# Patient Record
Sex: MALE | Race: BLACK OR AFRICAN AMERICAN | NOT HISPANIC OR LATINO
[De-identification: names, ages, dates, MRNs, and addresses within clinical notes are randomized per-mention and may not be internally consistent; named-entity substitution may affect disease eponyms.]

---

## 2021-05-04 DIAGNOSIS — Z09 ENCOUNTER FOR FOLLOW-UP EXAMINATION AFTER COMPLETED TREATMENT FOR CONDITIONS OTHER THAN MALIGNANT NEOPLASM: ICD-10-CM

## 2021-05-04 DIAGNOSIS — Z00.00 ENCOUNTER FOR GENERAL ADULT MEDICAL EXAMINATION W/OUT ABNORMAL FINDINGS: ICD-10-CM

## 2021-05-06 ENCOUNTER — APPOINTMENT (OUTPATIENT)
Dept: SURGERY | Facility: CLINIC | Age: 61
End: 2021-05-06
Payer: COMMERCIAL

## 2021-05-06 VITALS
BODY MASS INDEX: 36.65 KG/M2 | SYSTOLIC BLOOD PRESSURE: 144 MMHG | HEART RATE: 108 BPM | DIASTOLIC BLOOD PRESSURE: 70 MMHG | TEMPERATURE: 97.9 F | OXYGEN SATURATION: 94 % | HEIGHT: 70 IN | WEIGHT: 256 LBS

## 2021-05-06 DIAGNOSIS — R10.9 UNSPECIFIED ABDOMINAL PAIN: ICD-10-CM

## 2021-05-06 PROCEDURE — 99072 ADDL SUPL MATRL&STAF TM PHE: CPT

## 2021-05-06 PROCEDURE — 99214 OFFICE O/P EST MOD 30 MIN: CPT

## 2021-05-06 NOTE — PHYSICAL EXAM
[Obese] : obese [Normal] : affect appropriate [de-identified] : Normoactive bowel sounds, no hepatosplenomegaly, no masses, non-tender. LAP-BAND port in place slightly tender no rotation no fluid no redness

## 2021-05-06 NOTE — HISTORY OF PRESENT ILLNESS
[de-identified] : Pt had LB in Oct 2014 with Dr. Torres. Lost 60# and regained 40#. Since 2019, patient lost 50#. Pt C/O of abdominal pain near port site 3-4 months. Pain is constant and had his last fill in 2016. The area has been swollen and patient went for a CT Scan on 4/27/2021. CT was negative for acute pathology band and port appeared to be in good position with no findings suggestive of erosion or slip. Patient thinks the appearance of his abdominal wall has changed and is interested in removal rather than reciting or revision [de-identified] : DOLV: 11/23/2015 weight 302#

## 2021-05-06 NOTE — ASSESSMENT
[FreeTextEntry1] : LAP-BAND port site pain with no palpable or x-ray abnormality most likely represents dislodgment from abdominal wall

## 2021-05-06 NOTE — PLAN
[FreeTextEntry1] : Removal of lap band and port\par \par The risks benefits and alternatives were discussed at length and all of his questions were answered.\par The patient appears to understand and wishes to proceed.

## 2021-12-28 ENCOUNTER — APPOINTMENT (OUTPATIENT)
Dept: ULTRASOUND IMAGING | Facility: CLINIC | Age: 61
End: 2021-12-28
Payer: COMMERCIAL

## 2021-12-28 PROCEDURE — 76700 US EXAM ABDOM COMPLETE: CPT

## 2021-12-28 PROCEDURE — 76982 USE 1ST TARGET LESION: CPT | Mod: 59

## 2022-05-08 ENCOUNTER — EMERGENCY (EMERGENCY)
Facility: HOSPITAL | Age: 62
LOS: 1 days | Discharge: HOME | End: 2022-05-08
Attending: STUDENT IN AN ORGANIZED HEALTH CARE EDUCATION/TRAINING PROGRAM | Admitting: STUDENT IN AN ORGANIZED HEALTH CARE EDUCATION/TRAINING PROGRAM
Payer: COMMERCIAL

## 2022-05-08 VITALS
OXYGEN SATURATION: 100 % | DIASTOLIC BLOOD PRESSURE: 86 MMHG | HEART RATE: 78 BPM | SYSTOLIC BLOOD PRESSURE: 203 MMHG | TEMPERATURE: 98 F | RESPIRATION RATE: 18 BRPM

## 2022-05-08 VITALS
RESPIRATION RATE: 16 BRPM | HEART RATE: 74 BPM | DIASTOLIC BLOOD PRESSURE: 103 MMHG | TEMPERATURE: 98 F | SYSTOLIC BLOOD PRESSURE: 210 MMHG | OXYGEN SATURATION: 100 %

## 2022-05-08 PROCEDURE — 93010 ELECTROCARDIOGRAM REPORT: CPT

## 2022-05-08 PROCEDURE — 99284 EMERGENCY DEPT VISIT MOD MDM: CPT | Mod: 25

## 2022-05-08 NOTE — ED PROVIDER NOTE - NSICDXPASTMEDICALHX_GEN_ALL_CORE_FT
PAST MEDICAL HISTORY:  Diabetes mellitus type II     Diabetic neuropathy     Dyslipidemia     Obesity     Sleep apnea, obstructive on CPAP setting @17  Sleep studies on 04/2013

## 2022-05-08 NOTE — ED PROVIDER NOTE - OBJECTIVE STATEMENT
Patient is a 61 year-old-male with history of diabetes, HTN, and ?bundle branch block presents with high blood pressure. Noticed high blood pressure about a week ago, went to see his cardiologist and was prescribed eplerenone on top of ramipril. Patient started taking the eplerenone yesterday. Measured his blood pressure today and noted to be in the 200s/110s then remeasured and went down to 180s/90s. Went to urgent care and was told that he might have a stroke and sent to ER. Currently asymptomatic. Denies nausea, vomiting, headache, blurry vision, double vision, chest pain, shortness of breath, abdominal pain, urinary/bowel complaints. Has an appointment with his cardiologist on Thursday.

## 2022-05-08 NOTE — ED ADULT TRIAGE NOTE - CHIEF COMPLAINT QUOTE
Pt reporting to the ED for urgent care for HTN. Pt reports high blood pressure for 1 week increasing this morning at home 205/128. Pt taking EPLERENONE and ramapril with no relief. Pt denies headache, blurred vision ,chest pain or SOB at this time. pmh DM, HTN, HLD.

## 2022-05-08 NOTE — ED PROVIDER NOTE - NSFOLLOWUPINSTRUCTIONS_ED_ALL_ED_FT
You were seen in the emergency department for high blood pressure.     For pain, you may take Tylenol (acetaminophen) and/or ibuprofen (advil or motrin). Please follow the instructions on the label/container.     Please follow up with your primary care doctor within 48 hours for continuation of care.   Please follow up with your cardiologist on Thursday for further management of your blood pressure.     Return to the emergency department if you experience any new/concerning/worsening symptoms such as but not limited to: fever (>100.3F), intractable nausea, vomiting, chest pain, shortness of breath, unable to move part of your body, facial droop.

## 2022-05-08 NOTE — ED PROVIDER NOTE - PHYSICAL EXAMINATION
General: non-toxic appearing, in no respiratory distress  HEENT: atraumatic, normocephalic; pupils are equal, round and react to light, extraocular movements intact bilaterally without deficits, no conjunctival pallor, mucous membranes moist  Neck: no jugular venous distension, full range of motion  Chest/Lung: clear to auscultation bilaterally, no wheezes/rhonchi/rales  Heart: regular rate and rhythm, no murmur/gallops/rubs  Abdomen: normal bowel sounds, soft, non-tender, non-distended  Extremities: no lower extremity edema, +2 radial pulses bilaterally, +2 dorsalis pedis pulses bilaterally  Musculoskeletal: full range of motion of all 4 extremities with 5/5 strength and normal sensation   Nervous System: alert and oriented, no motor deficits or sensory deficits; CNII-XII grossly intact; no focal neurologic deficits  Skin: no rashes/lacerations noted

## 2022-05-08 NOTE — ED PROVIDER NOTE - PATIENT PORTAL LINK FT
You can access the FollowMyHealth Patient Portal offered by [facility] by registering at the following website: [email or web address]. By joining [facility]’s FollowMyHealth portal, you will also be able to view your health information using other applications (apps) compatible with our system.

## 2022-05-08 NOTE — ED PROVIDER NOTE - ATTENDING CONTRIBUTION TO CARE
I have personally seen and examined this patient.  I have fully participated in the care of this patient. I performed a substantive portion of the visit including all aspects of the medical decision making. I have reviewed all pertinent clinical information, including history, physical exam, plan and the Resident’s note and agree except as noted. - MD Merissa.    62 yo M, with HTN, having cardiologist, started on second meds for HTN since this Wednesday, pt has been on treatment for HTN for 3 yrs, no renal dysfunction, had lab work this February, here because went by . Pt had blurry vision, 10-20 minutes this morning, no headache or weakness or difficulty speaking, low suspicion for stroke but consitent with hypertensive urgency, but all symptoms were resolved, return precaution, making a record of BP until see the cardiologist, in 4 d, dc.

## 2022-05-08 NOTE — ED PROVIDER NOTE - CLINICAL SUMMARY MEDICAL DECISION MAKING FREE TEXT BOX
Patient is a 61 year-old-male with history of diabetes, HTN, and ?bundle branch block presents with asymptomatic HTN. Patient has been following cardiology for blood pressure management and started new medication yesterday. No signs of stroke/hypertensive emergency. No indications for blood pressure management in the ED. Discussed with patient and wife at bedside extensively about risk-benefit of blood pressure management and return precautions.

## 2022-05-08 NOTE — ED ADULT NURSE NOTE - OBJECTIVE STATEMENT
A&OX4 ambulatory self care 61 yr old male presenting to the ed today from home for HTN started on ramipril with minimal improvement of HTN of 180/ 90s. pt denies headache, chest pain, dizziness, double vison. awaiting md orders and eval

## 2023-04-06 ENCOUNTER — APPOINTMENT (OUTPATIENT)
Dept: SURGERY | Facility: CLINIC | Age: 63
End: 2023-04-06
Payer: COMMERCIAL

## 2023-04-06 VITALS
OXYGEN SATURATION: 97 % | WEIGHT: 254.31 LBS | DIASTOLIC BLOOD PRESSURE: 97 MMHG | TEMPERATURE: 97.1 F | BODY MASS INDEX: 36.41 KG/M2 | HEART RATE: 87 BPM | RESPIRATION RATE: 18 BRPM | HEIGHT: 70 IN | SYSTOLIC BLOOD PRESSURE: 168 MMHG

## 2023-04-06 DIAGNOSIS — Z98.84 BARIATRIC SURGERY STATUS: ICD-10-CM

## 2023-04-06 PROCEDURE — 99214 OFFICE O/P EST MOD 30 MIN: CPT | Mod: 25

## 2023-04-06 PROCEDURE — S2083 ADJUSTMENT GASTRIC BAND: CPT

## 2023-04-06 NOTE — DATA REVIEWED
[FreeTextEntry1] : \par \par Skin prepped with alcohol Band port accessed with Mullins needle  7.0 mL  removed   0 mL total\par All fluid removed from patient's Lap-Band today.  Patient no longer has fluid in his band 7 cc removed.  Port was easy to access all liquid was clear, no bubbles.

## 2023-04-06 NOTE — REVIEW OF SYSTEMS
[Abdominal Pain] : abdominal pain [Negative] : Allergic/Immunologic [Vomiting] : no vomiting [Reflux/Heartburn] : no reflex/heartburn [FreeTextEntry7] : Patient reports left quadrant Lap-Band port site pain.  Reports tenderness and "swelling"

## 2023-04-06 NOTE — HISTORY OF PRESENT ILLNESS
[Procedure: ___] : Procedure performed: [unfilled]  [Date of Surgery: ___] : Date of Surgery:   [unfilled] [Surgeon Name:   ___] : Surgeon Name: Dr. VILLALBA [Pre-Op Weight ___] : Pre-op weight was [unfilled] lbs [___ Years Post Op] : [unfilled] years [Walking] : walking [de-identified] : Patient interested in removing his Lap-Band.  Patient has an upper GI series scheduled for tomorrow April 7, 2023.  And is in the works to schedule his endoscopy.  This will rule out any pathology i.e. band erosion. [FreeTextEntry1] : Patient tolerating solid foods.  Denies GERD or dysphagia at the present time. [FreeTextEntry2] : Patient reports normal activities of daily living and walking regularly. [GERD] : no GERD

## 2023-04-06 NOTE — REASON FOR VISIT
[Gastric Banding] : gastric banding [de-identified] : 10/2/2013  [de-identified] : DOLV: 5/6/2021

## 2023-04-06 NOTE — ASSESSMENT
[___ Years Post Op] : [unfilled] years [Previous Visit Weight: ___] : Previous visit weight: [unfilled] lbs [Today's Weight: ___] : Today's weight:[unfilled] lbs [Today's BMI: ___] : Today's BMI: [unfilled] [de-identified] : Plan:\par Removal of all fluid from lap band (done)\par Careful monitoring of post fluid removal hyperphagia\par Complete GI work-up as planned\par Outpatient removal of lap band and port\par The risks benefits and alternatives were discussed at length and all of his questions were answered.\par The patient appears to understand and wishes to proceed.\par